# Patient Record
Sex: FEMALE | URBAN - METROPOLITAN AREA
[De-identification: names, ages, dates, MRNs, and addresses within clinical notes are randomized per-mention and may not be internally consistent; named-entity substitution may affect disease eponyms.]

---

## 2018-07-09 ENCOUNTER — IMPORTED ENCOUNTER (OUTPATIENT)
Dept: URBAN - METROPOLITAN AREA CLINIC 1 | Facility: CLINIC | Age: 53
End: 2018-07-09

## 2018-07-09 PROBLEM — B30.9: Noted: 2018-07-09

## 2018-07-09 PROCEDURE — 92012 INTRM OPH EXAM EST PATIENT: CPT

## 2018-07-09 NOTE — PATIENT DISCUSSION
1.  Viral Conjunctivitis OU -- Begin PF ATs Q1-2H OU (sample of PF Refresh Advance given). Begin Pred Forte TID OU (erx). The condition was discussed with the patient. Cool compresses and artificial tears were recommended. The mechanism of transmission was explained and the patient was educated to wash hands and use separate towels and bedding. Return for an appointment in 1 week 10 with Dr. Maria Eugenia Umaña.

## 2018-07-16 ENCOUNTER — IMPORTED ENCOUNTER (OUTPATIENT)
Dept: URBAN - METROPOLITAN AREA CLINIC 1 | Facility: CLINIC | Age: 53
End: 2018-07-16

## 2018-07-16 PROBLEM — B30.9: Noted: 2018-07-16

## 2018-07-16 PROCEDURE — 99213 OFFICE O/P EST LOW 20 MIN: CPT

## 2018-07-16 NOTE — PATIENT DISCUSSION
1.  Viral Conjunctivitis OU -- Improving. Continue PF ATs [id number] OU. Will taper Pred. Use Pred Forte TID OU X's 1 Week then BID OU X's 1 Week then Qday OU X's 1 Week then d/c. The condition was discussed with the patient. Cool compresses and artificial tears were recommended. The mechanism of transmission was explained and the patient was educated to wash hands and use separate towels and bedding. 2. Cataracts OU -- Observe Return for an appointment in 3 WKS for a 40 OU (If patient still showing symptoms / signs of Viral then will change and switch appt to a 30 / MRx) with Dr. Lucy Stanford.

## 2018-07-16 NOTE — PATIENT DISCUSSION
Viral Conjunctivitis OU -- The condition was discussed with the patient. Cool compresses and artificial tears were recommended. The mechanism of transmission was explained and the patient was educated to wash hands and use separate towels and bedding.

## 2018-08-02 ENCOUNTER — IMPORTED ENCOUNTER (OUTPATIENT)
Dept: URBAN - METROPOLITAN AREA CLINIC 1 | Facility: CLINIC | Age: 53
End: 2018-08-02

## 2018-08-02 PROBLEM — H16.143: Noted: 2018-08-02

## 2018-08-02 PROBLEM — H04.123: Noted: 2018-08-02

## 2018-08-02 PROCEDURE — 92012 INTRM OPH EXAM EST PATIENT: CPT

## 2018-08-02 NOTE — PATIENT DISCUSSION
1.  ALTA w/ PEK OS > OD -- Recommend the frequent use of OTC PF AT's Q2H OU. Begin Lotemax TID OU (ERx'd). Continue Celluvisc QHS OU. Consider Piotr Sprinkles / Restasis if no improvement. Recommend cool / cold moist compresses PRN OU. The condition was discussed with the patient. 2.  Viral Conjunctivitis OU -- Resolved. Has finished & is off of Pred now. 3.  Cataracts OU -- Observe Return for appointment as scheduled in August 2018 (change the appt in August from a 40 to a 10) for a 10 / Tear Lab OU (Dry Eye / K Check OU) with Dr. Pierre.

## 2018-08-13 ENCOUNTER — IMPORTED ENCOUNTER (OUTPATIENT)
Dept: URBAN - METROPOLITAN AREA CLINIC 1 | Facility: CLINIC | Age: 53
End: 2018-08-13

## 2018-08-13 PROBLEM — H16.143: Noted: 2018-08-13

## 2018-08-13 PROBLEM — H04.123: Noted: 2018-08-13

## 2018-08-13 PROCEDURE — 83861 MICROFLUID ANALY TEARS: CPT

## 2018-08-13 PROCEDURE — 92012 INTRM OPH EXAM EST PATIENT: CPT

## 2018-08-13 NOTE — PATIENT DISCUSSION
1.  ALTA w/ PEK OS > OD- Confirmed by tear lab OU. Still symptomatic. Start Xiidra OU BID (sample and coupon given to pt.) Continue frequent use of OTC PF AT's Q2H OU. Continue Lotemax TID OU until gone. Continue Celluvisc QHS OU. Recommend cold moist compresses PRN OU. The condition was discussed with the patient. 2.  Cataracts OU -- Observe Return for an appointment for 1 mo 10/check K on Xiidra with Dr. Claudia Ponce.

## 2022-04-02 ASSESSMENT — TONOMETRY
OD_IOP_MMHG: 11
OD_IOP_MMHG: 17
OS_IOP_MMHG: 17
OS_IOP_MMHG: 11
OD_IOP_MMHG: 15
OS_IOP_MMHG: 15

## 2022-04-02 ASSESSMENT — VISUAL ACUITY
OD_CC: 20/25
OS_CC: 20/30
OD_CC: 20/30
OD_CC: 20/30+2
OS_CC: 20/30
OD_CC: 20/30
OS_CC: 20/30-2
OS_CC: 20/30